# Patient Record
Sex: FEMALE | Race: WHITE | Employment: FULL TIME | URBAN - METROPOLITAN AREA
[De-identification: names, ages, dates, MRNs, and addresses within clinical notes are randomized per-mention and may not be internally consistent; named-entity substitution may affect disease eponyms.]

---

## 2017-03-27 ENCOUNTER — OFFICE VISIT (OUTPATIENT)
Dept: FAMILY MEDICINE CLINIC | Facility: CLINIC | Age: 22
End: 2017-03-27

## 2017-03-27 VITALS
OXYGEN SATURATION: 98 % | DIASTOLIC BLOOD PRESSURE: 60 MMHG | HEART RATE: 75 BPM | TEMPERATURE: 99 F | BODY MASS INDEX: 21 KG/M2 | RESPIRATION RATE: 16 BRPM | SYSTOLIC BLOOD PRESSURE: 120 MMHG | WEIGHT: 132 LBS

## 2017-03-27 DIAGNOSIS — J02.9 SORE THROAT: ICD-10-CM

## 2017-03-27 DIAGNOSIS — H10.33 ACUTE BACTERIAL CONJUNCTIVITIS OF BOTH EYES: Primary | ICD-10-CM

## 2017-03-27 DIAGNOSIS — H66.001 ACUTE SUPPURATIVE OTITIS MEDIA OF RIGHT EAR WITHOUT SPONTANEOUS RUPTURE OF TYMPANIC MEMBRANE, RECURRENCE NOT SPECIFIED: ICD-10-CM

## 2017-03-27 LAB — CONTROL LINE PRESENT WITH A CLEAR BACKGROUND (YES/NO): YES YES/NO

## 2017-03-27 PROCEDURE — 99213 OFFICE O/P EST LOW 20 MIN: CPT | Performed by: NURSE PRACTITIONER

## 2017-03-27 PROCEDURE — 87880 STREP A ASSAY W/OPTIC: CPT | Performed by: NURSE PRACTITIONER

## 2017-03-27 RX ORDER — TOBRAMYCIN 3 MG/ML
2 SOLUTION/ DROPS OPHTHALMIC EVERY 6 HOURS
Qty: 1 BOTTLE | Refills: 0 | Status: SHIPPED | OUTPATIENT
Start: 2017-03-27 | End: 2017-04-03

## 2017-03-27 RX ORDER — AMOXICILLIN 875 MG/1
875 TABLET, COATED ORAL 2 TIMES DAILY
Qty: 20 TABLET | Refills: 0 | Status: SHIPPED | OUTPATIENT
Start: 2017-03-27 | End: 2017-04-06

## 2017-03-27 NOTE — PATIENT INSTRUCTIONS
Conjunctivitis, Nonspecific    The membrane that covers the white part of your eye (the conjunctiva) is inflamed. Inflammation happens when your body responds to an injury, allergic reaction, infection, or illness.  Symptoms of inflammation in the eye may You have an infection of the middle ear, the space behind the eardrum. This is also called acute otitis media (AOM). Sometimes it is caused by the common cold.  This is because congestion can block the internal passage (eustachian tube) that drains fluid fr

## 2017-03-27 NOTE — PROGRESS NOTES
CHIEF COMPLAINT:   Patient presents with:  Pink Eye: pt c\o of pink eye in both eye,x 1day sore throat x 1wk       HPI:   Sandy Pathak is a 24year old female who presents with chief complaint of \"pink eye\". Symptoms began  3  days ago.  Symptoms have HENT: atraumatic, normocephalic,ears R injected with erythema, pos bulging, bony landmarks not visible, L wnl, pos bulging. Nose pos clear drainage red and inflammed. throat are clear  NECK: supple, non tender  LUNGS: clear to auscultation bilaterally.    C Advised patient to avoid touching eyes. Stressed importance of good handwashing as conjunctivitis is very contagious. Warm compresses to affected eye prn. Can return to work/school after on medication for 24 hours.     Patient Instructions     Conjunctiv © 8723-9873 The [address] [place] [address], [address] [place]. All rights reserved. This information is not intended as a substitute for professional medical care. Always follow your healthcare professional's instructions.         Middle © 0734-6473 The [address] [place] [address], [address] [place]. All rights reserved. This information is not intended as a substitute for professional medical care. Always follow your healthcare professional's instructions.             Isaiah

## 2017-05-03 ENCOUNTER — OFFICE VISIT (OUTPATIENT)
Dept: FAMILY MEDICINE CLINIC | Facility: CLINIC | Age: 22
End: 2017-05-03

## 2017-05-03 VITALS
TEMPERATURE: 98 F | DIASTOLIC BLOOD PRESSURE: 60 MMHG | RESPIRATION RATE: 18 BRPM | SYSTOLIC BLOOD PRESSURE: 100 MMHG | WEIGHT: 132 LBS | BODY MASS INDEX: 21 KG/M2 | OXYGEN SATURATION: 98 % | HEART RATE: 77 BPM

## 2017-05-03 DIAGNOSIS — N30.01 ACUTE CYSTITIS WITH HEMATURIA: ICD-10-CM

## 2017-05-03 DIAGNOSIS — R30.9 PAIN PASSING URINE: Primary | ICD-10-CM

## 2017-05-03 PROCEDURE — 81003 URINALYSIS AUTO W/O SCOPE: CPT | Performed by: NURSE PRACTITIONER

## 2017-05-03 PROCEDURE — 87086 URINE CULTURE/COLONY COUNT: CPT | Performed by: NURSE PRACTITIONER

## 2017-05-03 PROCEDURE — 99213 OFFICE O/P EST LOW 20 MIN: CPT | Performed by: NURSE PRACTITIONER

## 2017-05-03 RX ORDER — CIPROFLOXACIN 500 MG/1
500 TABLET, FILM COATED ORAL 2 TIMES DAILY
Qty: 10 TABLET | Refills: 0 | Status: SHIPPED | OUTPATIENT
Start: 2017-05-03 | End: 2017-05-08

## 2017-05-03 NOTE — PROGRESS NOTES
CHIEF COMPLAINT:   Patient presents with:  UTI: pt c\o of UTI       HPI:   Mayuri Benz is a 24year old female who presents with symptoms of UTI. Complaining of urinary frequency, urgency, dysuria for last 2 days.  Symptoms have been constant since on GLUCOSE (URINE DIPSTICK) 100 Negative mg/dL   BILIRUBIN neg Negative   KETONES (URINE DIPSTICK) neg Negative mg/dL   SPECIFIC GRAVITY 1.020 1.005 - 1.030   OCCULT BLOOD small Negative   PH, URINE 5.0 4.5 - 8.0   PROTEIN (URINE DIPSTICK) trace Negative/Trac 3. We will send urine culture to lab and call you with results in 3-4 days. At that time we will discuss continuing, stopping, or changing the antibiotic based on the urine culture results.   4. Follow up with PMD in 4-5 days for reeval. Follow up sooner or The patient/parent indicates understanding of these issues and agrees to the plan. The patient/parent is asked to return in 3 days if not better. Call if fever, vomiting, worsening symptoms.

## 2017-05-03 NOTE — PATIENT INSTRUCTIONS
1. Rest. Drink plenty of fluids. 2. Ciprofloxacin as prescribed. 3. We will send urine culture to lab and call you with results in 3-4 days. At that time we will discuss continuing, stopping, or changing the antibiotic based on the urine culture results. © 3300-9218 [address], [address] [place]. All rights reserved. This information is not intended as a substitute for professional medical care. Always follow your healthcare professional's instructions.

## 2018-01-05 ENCOUNTER — OFFICE VISIT (OUTPATIENT)
Dept: FAMILY MEDICINE CLINIC | Facility: CLINIC | Age: 23
End: 2018-01-05

## 2018-01-05 VITALS
TEMPERATURE: 99 F | OXYGEN SATURATION: 97 % | RESPIRATION RATE: 14 BRPM | WEIGHT: 142 LBS | DIASTOLIC BLOOD PRESSURE: 70 MMHG | HEART RATE: 74 BPM | SYSTOLIC BLOOD PRESSURE: 110 MMHG | BODY MASS INDEX: 23 KG/M2

## 2018-01-05 DIAGNOSIS — N30.00 ACUTE CYSTITIS WITHOUT HEMATURIA: ICD-10-CM

## 2018-01-05 DIAGNOSIS — R39.9 UTI SYMPTOMS: Primary | ICD-10-CM

## 2018-01-05 LAB
APPEARANCE: CLEAR
GLUCOSE (URINE DIPSTICK): 100 MG/DL
MULTISTIX LOT#: ABNORMAL NUMERIC
NITRITE, URINE: POSITIVE
OCCULT BLOOD: 6.5
PH, URINE: 6.5 (ref 4.5–8)
PROTEIN (URINE DIPSTICK): 100 MG/DL
SPECIFIC GRAVITY: 1.02 (ref 1–1.03)
UROBILINOGEN,SEMI-QN: 2 MG/DL (ref 0–1.9)

## 2018-01-05 PROCEDURE — 99213 OFFICE O/P EST LOW 20 MIN: CPT | Performed by: FAMILY MEDICINE

## 2018-01-05 PROCEDURE — 87186 SC STD MICRODIL/AGAR DIL: CPT | Performed by: FAMILY MEDICINE

## 2018-01-05 PROCEDURE — 81003 URINALYSIS AUTO W/O SCOPE: CPT | Performed by: FAMILY MEDICINE

## 2018-01-05 PROCEDURE — 87086 URINE CULTURE/COLONY COUNT: CPT | Performed by: FAMILY MEDICINE

## 2018-01-05 PROCEDURE — 87077 CULTURE AEROBIC IDENTIFY: CPT | Performed by: FAMILY MEDICINE

## 2018-01-05 RX ORDER — CIPROFLOXACIN 500 MG/1
500 TABLET, FILM COATED ORAL 2 TIMES DAILY
Qty: 10 TABLET | Refills: 0 | Status: SHIPPED | OUTPATIENT
Start: 2018-01-05 | End: 2018-01-10

## 2018-01-05 NOTE — PROGRESS NOTES
Mayuri Benz is a 20year old female. HPI:   Patient's presenting with urinary complaints of burning on urination, urinary hesitancy, and mild suprapubic pressure. Patient reports no flank pain and no fever and no chills.   Patient reports no vaginal d edema    ASSESSMENT AND PLAN:     1. UTI symptoms  -  - URINALYSIS, AUTO, W/O SCOPE  - URINE CULTURE, ROUTINE    2. Acute cystitis without hematuria  -oral cipro x 5 days.     -to drink plenty of water, OTC pyridium as needed.        The patient indicates u

## 2018-01-08 ENCOUNTER — TELEPHONE (OUTPATIENT)
Dept: FAMILY MEDICINE CLINIC | Facility: CLINIC | Age: 23
End: 2018-01-08

## 2018-01-09 NOTE — TELEPHONE ENCOUNTER
Notes Recorded by Danny Zavaleta MD on 1/7/2018 at 3:21 PM CST  On cipro.    Culture     10,000 - 50,000 CFU/ML Escherichia coli

## 2018-01-09 NOTE — TELEPHONE ENCOUNTER
Called patient and left message per HIPAA of results below. Advised patient to complete course of antibiotics. Advised patient to contact the office with any questions.

## 2018-06-25 ENCOUNTER — OFFICE VISIT (OUTPATIENT)
Dept: FAMILY MEDICINE CLINIC | Facility: CLINIC | Age: 23
End: 2018-06-25

## 2018-06-25 VITALS
TEMPERATURE: 98 F | OXYGEN SATURATION: 97 % | HEIGHT: 66 IN | WEIGHT: 141.63 LBS | SYSTOLIC BLOOD PRESSURE: 116 MMHG | HEART RATE: 81 BPM | BODY MASS INDEX: 22.76 KG/M2 | DIASTOLIC BLOOD PRESSURE: 82 MMHG | RESPIRATION RATE: 16 BRPM

## 2018-06-25 DIAGNOSIS — R39.9 UTI SYMPTOMS: ICD-10-CM

## 2018-06-25 DIAGNOSIS — N30.00 ACUTE CYSTITIS WITHOUT HEMATURIA: Primary | ICD-10-CM

## 2018-06-25 PROCEDURE — 87086 URINE CULTURE/COLONY COUNT: CPT | Performed by: PHYSICIAN ASSISTANT

## 2018-06-25 PROCEDURE — 81003 URINALYSIS AUTO W/O SCOPE: CPT | Performed by: PHYSICIAN ASSISTANT

## 2018-06-25 PROCEDURE — 99213 OFFICE O/P EST LOW 20 MIN: CPT | Performed by: PHYSICIAN ASSISTANT

## 2018-06-25 RX ORDER — CIPROFLOXACIN 500 MG/1
500 TABLET, FILM COATED ORAL 2 TIMES DAILY
Qty: 10 TABLET | Refills: 0 | Status: SHIPPED | OUTPATIENT
Start: 2018-06-25 | End: 2018-06-30

## 2018-06-25 NOTE — PROGRESS NOTES
CHIEF COMPLAINT:   Patient presents with:  Painful Urination: x3days  Burning On Urination: x3days  Urinary Urgency: x3days      HPI:   Mayuri Benz is a 20year old female who presents with symptoms of UTI.  The patient reports urinary frequency, urge LUNGS: clear to ausculation bilaterally, no wheezing or rhonchi  GI: BS present x 4. No hepatosplenomegaly. No tenderness.    BACK: No CVA tenderness      Recent Results (from the past 24 hour(s))  -URINALYSIS, AUTO, W/O SCOPE   Collection Time: 06/25/18 Urine is normally doesn't have any bacteria in it. But bacteria can get into the urinary tract from the skin around the rectum. Or they can travel in the blood from elsewhere in the body.  Once they are in your urinary tract, they can cause infection in the · Procedures such as having a catheter inserted  · Older age  · Not emptying your bladder. This can allow bacteria a chance to grow in your urine.   · Dehydration  · Constipation  · Sex  · Use of a diaphragm for birth control   Treatment  Bladder infections · Urinate right after intercourse to flush out your bladder. · If you use birth control pills and have frequent bladder infections, discuss it with your doctor.   Follow-up care  Call your healthcare provider if all symptoms are not gone after 3 days of tr

## 2018-06-25 NOTE — PATIENT INSTRUCTIONS
1. Cipro  2. Urine culture sent  3. Increase fluids  4. Follow up with PCP    Bladder Infection, Female (Adult)    Urine is normally doesn't have any bacteria in it. But bacteria can get into the urinary tract from the skin around the rectum.  Or they can The most common cause of bladder infections is bacteria from the bowels. The bacteria get onto the skin around the opening of the urethra. From there, they can get into the urine and travel up to the bladder, causing inflammation and infection.  This usuall · Urinate more often. Don't try to hold urine in for a long time. · Wear loose-fitting clothes and cotton underwear. Avoid tight-fitting pants. · Improve your diet and prevent constipation.  Eat more fresh fruit and vegetables, and fiber, and less junk an

## 2018-06-27 ENCOUNTER — TELEPHONE (OUTPATIENT)
Dept: FAMILY MEDICINE CLINIC | Facility: CLINIC | Age: 23
End: 2018-06-27

## 2018-06-27 DIAGNOSIS — B37.9 YEAST INFECTION: Primary | ICD-10-CM

## 2018-06-27 RX ORDER — FLUCONAZOLE 150 MG/1
150 TABLET ORAL ONCE
Qty: 1 TABLET | Refills: 0 | Status: SHIPPED | OUTPATIENT
Start: 2018-06-27 | End: 2018-06-27

## 2018-10-23 ENCOUNTER — OFFICE VISIT (OUTPATIENT)
Dept: FAMILY MEDICINE CLINIC | Facility: CLINIC | Age: 23
End: 2018-10-23
Payer: COMMERCIAL

## 2018-10-23 ENCOUNTER — LAB ENCOUNTER (OUTPATIENT)
Dept: LAB | Age: 23
End: 2018-10-23
Attending: FAMILY MEDICINE
Payer: COMMERCIAL

## 2018-10-23 VITALS
TEMPERATURE: 98 F | HEART RATE: 65 BPM | DIASTOLIC BLOOD PRESSURE: 80 MMHG | OXYGEN SATURATION: 99 % | SYSTOLIC BLOOD PRESSURE: 112 MMHG | WEIGHT: 144.19 LBS | HEIGHT: 66 IN | BODY MASS INDEX: 23.17 KG/M2 | RESPIRATION RATE: 16 BRPM

## 2018-10-23 DIAGNOSIS — N39.0 RECURRENT UTI: ICD-10-CM

## 2018-10-23 DIAGNOSIS — Z11.3 SCREEN FOR STD (SEXUALLY TRANSMITTED DISEASE): ICD-10-CM

## 2018-10-23 DIAGNOSIS — R11.2 NAUSEA AND VOMITING, INTRACTABILITY OF VOMITING NOT SPECIFIED, UNSPECIFIED VOMITING TYPE: ICD-10-CM

## 2018-10-23 DIAGNOSIS — R39.9 UTI SYMPTOMS: Primary | ICD-10-CM

## 2018-10-23 PROCEDURE — 84439 ASSAY OF FREE THYROXINE: CPT | Performed by: FAMILY MEDICINE

## 2018-10-23 PROCEDURE — 81003 URINALYSIS AUTO W/O SCOPE: CPT | Performed by: FAMILY MEDICINE

## 2018-10-23 PROCEDURE — 81025 URINE PREGNANCY TEST: CPT | Performed by: FAMILY MEDICINE

## 2018-10-23 PROCEDURE — 87086 URINE CULTURE/COLONY COUNT: CPT | Performed by: FAMILY MEDICINE

## 2018-10-23 PROCEDURE — 99214 OFFICE O/P EST MOD 30 MIN: CPT | Performed by: FAMILY MEDICINE

## 2018-10-23 PROCEDURE — 80050 GENERAL HEALTH PANEL: CPT | Performed by: FAMILY MEDICINE

## 2018-10-23 PROCEDURE — 36415 COLL VENOUS BLD VENIPUNCTURE: CPT | Performed by: FAMILY MEDICINE

## 2018-10-23 PROCEDURE — 87491 CHLMYD TRACH DNA AMP PROBE: CPT | Performed by: FAMILY MEDICINE

## 2018-10-23 PROCEDURE — 87591 N.GONORRHOEAE DNA AMP PROB: CPT | Performed by: FAMILY MEDICINE

## 2018-10-23 RX ORDER — ONDANSETRON 4 MG/1
4 TABLET, FILM COATED ORAL EVERY 8 HOURS PRN
Qty: 20 TABLET | Refills: 1 | Status: SHIPPED | OUTPATIENT
Start: 2018-10-23 | End: 2019-01-03 | Stop reason: ALTCHOICE

## 2018-10-23 NOTE — PROGRESS NOTES
Chief Complaint:   Patient presents with:  Burning On Urination: x 1 month, pressure in pelvic area  Nausea: x 4 days    HPI:   This is a 24year old female presenting with worsening pelvic pain along with dysuria x 1 week.    Patient's sexually active with Answered       REVIEW OF SYSTEMS:   Review of Systems   Constitutional: Negative for chills, diaphoresis, fatigue and fever.    HENT: Negative for congestion, ear pain, facial swelling, postnasal drip, rhinorrhea, sinus pressure, sore throat and voice singh person, place, and time. She appears well-developed and well-nourished. HENT:   Head: Normocephalic and atraumatic. Right Ear: Tympanic membrane and ear canal normal. Tympanic membrane is not erythematous.  No cerumen present  Left Ear: Tympanic membran CHLAMYDIA/GONOCOCCUS, PHOEBE; Future  - CHLAMYDIA/GONOCOCCUS, PHOEBE  - URINE CULTURE, ROUTINE    2. Nausea and vomiting, intractability of vomiting not specified, unspecified vomiting type  -will check labs. - CBC WITH DIFFERENTIAL WITH PLATELET;  Future  - CO

## 2018-10-24 ENCOUNTER — TELEPHONE (OUTPATIENT)
Dept: FAMILY MEDICINE CLINIC | Facility: CLINIC | Age: 23
End: 2018-10-24

## 2018-10-24 NOTE — TELEPHONE ENCOUNTER
----- Message from Johann Simon MD sent at 10/24/2018  1:18 PM CDT -----  Slightly elevated WBCs and neutrophils. Ok to watch for now. Negative STD screen(ivon/hanna). Have patient make follow up appt in 1-2 weeks if no improvement.

## 2019-01-03 ENCOUNTER — OFFICE VISIT (OUTPATIENT)
Dept: FAMILY MEDICINE CLINIC | Facility: CLINIC | Age: 24
End: 2019-01-03
Payer: COMMERCIAL

## 2019-01-03 VITALS
RESPIRATION RATE: 16 BRPM | HEART RATE: 76 BPM | OXYGEN SATURATION: 96 % | WEIGHT: 147 LBS | BODY MASS INDEX: 23.63 KG/M2 | TEMPERATURE: 98 F | SYSTOLIC BLOOD PRESSURE: 122 MMHG | HEIGHT: 66 IN | DIASTOLIC BLOOD PRESSURE: 68 MMHG

## 2019-01-03 DIAGNOSIS — N30.00 ACUTE CYSTITIS WITHOUT HEMATURIA: Primary | ICD-10-CM

## 2019-01-03 LAB
APPEARANCE: CLEAR
GLUCOSE (URINE DIPSTICK): 100 MG/DL
MULTISTIX LOT#: ABNORMAL NUMERIC
NITRITE, URINE: POSITIVE
PH, URINE: 5.5 (ref 4.5–8)
PROTEIN (URINE DIPSTICK): 30 MG/DL
SPECIFIC GRAVITY: 1.01 (ref 1–1.03)
UROBILINOGEN,SEMI-QN: 2 MG/DL (ref 0–1.9)

## 2019-01-03 PROCEDURE — 99213 OFFICE O/P EST LOW 20 MIN: CPT | Performed by: FAMILY MEDICINE

## 2019-01-03 PROCEDURE — 81003 URINALYSIS AUTO W/O SCOPE: CPT | Performed by: FAMILY MEDICINE

## 2019-01-03 PROCEDURE — 87086 URINE CULTURE/COLONY COUNT: CPT | Performed by: FAMILY MEDICINE

## 2019-01-03 RX ORDER — CIPROFLOXACIN 500 MG/1
500 TABLET, FILM COATED ORAL 2 TIMES DAILY
Qty: 10 TABLET | Refills: 0 | Status: SHIPPED | OUTPATIENT
Start: 2019-01-03 | End: 2019-05-22

## 2019-01-03 NOTE — PROGRESS NOTES
Liat Lloyd is a 24year old female. Patient presents with:  UTI: burning with urination, pressure on bladder, frequent urination      HPI:   Patient presents with symptoms of UTI.  Complaining of urinary frequency, urgency, dysuria, suprapubic pain fo

## 2019-05-22 ENCOUNTER — OFFICE VISIT (OUTPATIENT)
Dept: FAMILY MEDICINE CLINIC | Facility: CLINIC | Age: 24
End: 2019-05-22
Payer: COMMERCIAL

## 2019-05-22 VITALS
RESPIRATION RATE: 17 BRPM | DIASTOLIC BLOOD PRESSURE: 60 MMHG | WEIGHT: 142.81 LBS | HEART RATE: 57 BPM | TEMPERATURE: 98 F | HEIGHT: 66 IN | SYSTOLIC BLOOD PRESSURE: 112 MMHG | BODY MASS INDEX: 22.95 KG/M2 | OXYGEN SATURATION: 98 %

## 2019-05-22 DIAGNOSIS — Z13.21 SCREENING FOR ENDOCRINE, NUTRITIONAL, METABOLIC AND IMMUNITY DISORDER: ICD-10-CM

## 2019-05-22 DIAGNOSIS — Z13.29 SCREENING FOR ENDOCRINE, NUTRITIONAL, METABOLIC AND IMMUNITY DISORDER: ICD-10-CM

## 2019-05-22 DIAGNOSIS — R39.9 UTI SYMPTOMS: Primary | ICD-10-CM

## 2019-05-22 DIAGNOSIS — Z13.228 SCREENING FOR ENDOCRINE, NUTRITIONAL, METABOLIC AND IMMUNITY DISORDER: ICD-10-CM

## 2019-05-22 DIAGNOSIS — Z13.0 SCREENING FOR ENDOCRINE, NUTRITIONAL, METABOLIC AND IMMUNITY DISORDER: ICD-10-CM

## 2019-05-22 PROCEDURE — 87088 URINE BACTERIA CULTURE: CPT | Performed by: NURSE PRACTITIONER

## 2019-05-22 PROCEDURE — 99213 OFFICE O/P EST LOW 20 MIN: CPT | Performed by: NURSE PRACTITIONER

## 2019-05-22 PROCEDURE — 81003 URINALYSIS AUTO W/O SCOPE: CPT | Performed by: NURSE PRACTITIONER

## 2019-05-22 PROCEDURE — 87086 URINE CULTURE/COLONY COUNT: CPT | Performed by: NURSE PRACTITIONER

## 2019-05-22 PROCEDURE — 87186 SC STD MICRODIL/AGAR DIL: CPT | Performed by: NURSE PRACTITIONER

## 2019-05-22 RX ORDER — NITROFURANTOIN 25; 75 MG/1; MG/1
100 CAPSULE ORAL 2 TIMES DAILY
Qty: 10 CAPSULE | Refills: 0 | Status: SHIPPED | OUTPATIENT
Start: 2019-05-22 | End: 2019-05-27

## 2019-05-22 RX ORDER — PHENAZOPYRIDINE HYDROCHLORIDE 200 MG/1
200 TABLET, FILM COATED ORAL 3 TIMES DAILY PRN
Qty: 6 TABLET | Refills: 0 | Status: SHIPPED | OUTPATIENT
Start: 2019-05-22 | End: 2019-05-24

## 2019-05-22 NOTE — PROGRESS NOTES
Chief Complaint:   Patient presents with:  UTI: pain when urinate , nausea , odor , last night     HPI:   This is a 24year old female presenting with dysuria and suprapubic pressure x 1 day. Also reports cloudy urine with foul odor. + nausea.   This is a r myalgias, back pain, joint swelling and joint pain. Skin: Negative for pallor, rash and wound. Neurological: Negative for dizziness, seizures and numbness.        EXAM:   /60   Pulse 57   Temp 97.8 °F (36.6 °C) (Oral)   Resp 17   Ht 66\"   Wt 142 irritants.   -Follow up in 1 week if no improvement, sooner if symptoms worsen.   -Discussed potential for urology f/u in the future, especially if cultures are persistently negative.      2. Screening for endocrine, nutritional, metabolic and immunity diso

## 2019-05-22 NOTE — PATIENT INSTRUCTIONS
Urinary Tract Infections in Women    Urinary tract infections (UTIs) are most often caused by bacteria. These bacteria enter the urinary tract. The bacteria may come from outside the body.  Or they may travel from the skin outside the rectum or vagina i The lifestyle changes below will help get rid of your UTI. They may also help prevent future UTIs. · Drink plenty of fluids. This includes water, juice, or other caffeine-free drinks. Fluids help flush bacteria out of your body. · Empty your bladder.  Alw

## 2019-05-29 ENCOUNTER — TELEPHONE (OUTPATIENT)
Dept: FAMILY MEDICINE CLINIC | Facility: CLINIC | Age: 24
End: 2019-05-29

## 2019-05-29 NOTE — TELEPHONE ENCOUNTER
Received message from answering service. 'looking for test results and meds are not working.' results were released to [employer] and macrobid sensitive to culture. Left message results were released to [employer] and antibiotic sensitive to bacteria.  #left to

## 2020-10-11 ENCOUNTER — OFFICE VISIT (OUTPATIENT)
Dept: FAMILY MEDICINE CLINIC | Facility: CLINIC | Age: 25
End: 2020-10-11
Payer: COMMERCIAL

## 2020-10-11 VITALS
WEIGHT: 131.81 LBS | OXYGEN SATURATION: 98 % | HEIGHT: 65 IN | SYSTOLIC BLOOD PRESSURE: 108 MMHG | HEART RATE: 76 BPM | RESPIRATION RATE: 16 BRPM | TEMPERATURE: 98 F | DIASTOLIC BLOOD PRESSURE: 60 MMHG | BODY MASS INDEX: 21.96 KG/M2

## 2020-10-11 DIAGNOSIS — B37.3 VAGINAL CANDIDA: ICD-10-CM

## 2020-10-11 DIAGNOSIS — R39.9 UTI SYMPTOMS: ICD-10-CM

## 2020-10-11 DIAGNOSIS — R30.0 DYSURIA: Primary | ICD-10-CM

## 2020-10-11 PROCEDURE — 3008F BODY MASS INDEX DOCD: CPT | Performed by: NURSE PRACTITIONER

## 2020-10-11 PROCEDURE — 87088 URINE BACTERIA CULTURE: CPT | Performed by: NURSE PRACTITIONER

## 2020-10-11 PROCEDURE — 87086 URINE CULTURE/COLONY COUNT: CPT | Performed by: NURSE PRACTITIONER

## 2020-10-11 PROCEDURE — 87186 SC STD MICRODIL/AGAR DIL: CPT | Performed by: NURSE PRACTITIONER

## 2020-10-11 PROCEDURE — 3078F DIAST BP <80 MM HG: CPT | Performed by: NURSE PRACTITIONER

## 2020-10-11 PROCEDURE — 3074F SYST BP LT 130 MM HG: CPT | Performed by: NURSE PRACTITIONER

## 2020-10-11 PROCEDURE — 81003 URINALYSIS AUTO W/O SCOPE: CPT | Performed by: NURSE PRACTITIONER

## 2020-10-11 PROCEDURE — 99213 OFFICE O/P EST LOW 20 MIN: CPT | Performed by: NURSE PRACTITIONER

## 2020-10-11 RX ORDER — FLUCONAZOLE 150 MG/1
150 TABLET ORAL ONCE
Qty: 1 TABLET | Refills: 0 | Status: SHIPPED | OUTPATIENT
Start: 2020-10-11 | End: 2020-10-11

## 2020-10-11 RX ORDER — CEPHALEXIN 500 MG/1
500 CAPSULE ORAL 2 TIMES DAILY
Qty: 14 CAPSULE | Refills: 0 | Status: SHIPPED | OUTPATIENT
Start: 2020-10-11 | End: 2020-10-18

## 2020-10-11 NOTE — PROGRESS NOTES
Mariela Canales is a 31year old female. HPI:   Patient presents with urinary symptoms. Complaining of urinary frequency, urgency, dysuria, suprapubic pain. Denies back pain, fever, hematuria.   Duration: since yesterday  Sexual activity: 3 days ago with spo Urobilinogen Urine 0.2 0.0 - 1.9 mg/dL    Nitrite Urine positive Negative    Leukocyte Esterase Urine negative Negative    APPEARANCE clear Clear    Color Urine dark yellow Yellow    Multistix Lot# [id number] Numeric    Multistix Expiration Date 10/31/21 Date

## 2020-10-11 NOTE — PATIENT INSTRUCTIONS
· Please start the antibiotic as discussed. We will send a urine culture and advise you if a change is needed in your antibiotic.   · You may use Diflucan once by mouth for vaginal itching or yeast.  · Take probiotics or yogurt daily during antibiotic use t

## 2020-10-16 ENCOUNTER — OFFICE VISIT (OUTPATIENT)
Dept: FAMILY MEDICINE CLINIC | Facility: CLINIC | Age: 25
End: 2020-10-16
Payer: COMMERCIAL

## 2020-10-16 VITALS
OXYGEN SATURATION: 98 % | HEART RATE: 90 BPM | SYSTOLIC BLOOD PRESSURE: 108 MMHG | TEMPERATURE: 98 F | RESPIRATION RATE: 16 BRPM | DIASTOLIC BLOOD PRESSURE: 72 MMHG | HEIGHT: 65 IN | WEIGHT: 131 LBS | BODY MASS INDEX: 21.83 KG/M2

## 2020-10-16 DIAGNOSIS — R10.9 LEFT FLANK PAIN: ICD-10-CM

## 2020-10-16 DIAGNOSIS — N30.00 ACUTE CYSTITIS WITHOUT HEMATURIA: Primary | ICD-10-CM

## 2020-10-16 PROCEDURE — 3074F SYST BP LT 130 MM HG: CPT | Performed by: NURSE PRACTITIONER

## 2020-10-16 PROCEDURE — 3078F DIAST BP <80 MM HG: CPT | Performed by: NURSE PRACTITIONER

## 2020-10-16 PROCEDURE — 3008F BODY MASS INDEX DOCD: CPT | Performed by: NURSE PRACTITIONER

## 2020-10-16 PROCEDURE — 99213 OFFICE O/P EST LOW 20 MIN: CPT | Performed by: NURSE PRACTITIONER

## 2020-10-16 RX ORDER — SULFAMETHOXAZOLE AND TRIMETHOPRIM 800; 160 MG/1; MG/1
1 TABLET ORAL 2 TIMES DAILY
Qty: 20 TABLET | Refills: 0 | Status: SHIPPED | OUTPATIENT
Start: 2020-10-16 | End: 2020-10-26

## 2020-10-16 NOTE — PATIENT INSTRUCTIONS
Go to ER or IC if worsening symptoms or no improvement in 24-48 hours    Kidney Infection (Adult Female)     An infection in one or both kidneys is called pyelonephritis. It usually happens when bacteria ) get into the kidney.  Rarely it is caused when ot · Take antibiotics exactly as prescribed and until they are used up, even if you feel better. It's important to finish them to make sure the infection is gone.   · Unless another medicine was prescribed, you can use over-the-counter medicines for pain, feve · Improve your diet to prevent constipation. Eat more fruits, vegetables, and fiber. Eat less junk and fatty foods. Constipation can make a urinary tract infection more likely. Talk with your healthcare provider if you have trouble with bowel movements.   · © 5410-6173 The [address]. [address] [facility] [place]. All rights reserved. This information is not intended as a substitute for professional medical care. Always follow your healthcare professional's instructions.

## 2020-10-16 NOTE — PROGRESS NOTES
CHIEF COMPLAINT:   Patient presents with:  UTI: here on 10/11/20. lower left back pain x 1 day. uti sx improving. still taking abx      HPI:   Roman Mane is a 31year old female who presents with symptoms of UTI.  Patient was seen 5 days ago and treate : no suprapubic tenderness. + Left CVAT. No bladder distention. No results found for this or any previous visit (from the past 24 hour(s)). ASSESSMENT AND PLAN:   Mumtaz Molina is a 31year old female presents with UTI symptoms.     ASSESSMENT: · Not keeping the genital area clean and dry, which promotes the growth of bacteria  · Wiping back to front. This drags bacteria from the rectum toward the urinary opening (urethra). · Wearing tight pants or underwear.  This lets moisture build up in the g · Drink lots of fluid unless you must restrict fluids for other medical reasons. This will force the medicine into your urinary system and flush the bacteria out of your body. Ask your healthcare provider how much you should drink.   · Don't have sex until If you had an X-ray, CT scan, or other diagnostic test, you will be notified of any new findings that may affect your care.   Call 911  Call 911 if any of the following occur:  · Trouble breathing  · Fainting or loss of consciousness  · Rapid or very slow h

## 2021-02-01 ENCOUNTER — OFFICE VISIT (OUTPATIENT)
Dept: FAMILY MEDICINE CLINIC | Facility: CLINIC | Age: 26
End: 2021-02-01
Payer: COMMERCIAL

## 2021-02-01 VITALS
WEIGHT: 125 LBS | SYSTOLIC BLOOD PRESSURE: 126 MMHG | OXYGEN SATURATION: 98 % | TEMPERATURE: 98 F | HEIGHT: 65 IN | DIASTOLIC BLOOD PRESSURE: 68 MMHG | BODY MASS INDEX: 20.83 KG/M2 | RESPIRATION RATE: 18 BRPM

## 2021-02-01 DIAGNOSIS — R39.9 UTI SYMPTOMS: Primary | ICD-10-CM

## 2021-02-01 LAB
APPEARANCE: CLEAR
BILIRUBIN: NEGATIVE
CONTROL LINE PRESENT WITH A CLEAR BACKGROUND (YES/NO): YES YES/NO
GLUCOSE (URINE DIPSTICK): NEGATIVE MG/DL
KETONES (URINE DIPSTICK): NEGATIVE MG/DL
MULTISTIX LOT#: 5077 NUMERIC
PH, URINE: 5.5 (ref 4.5–8)
PREGNANCY TEST, URINE: NEGATIVE
PROTEIN (URINE DIPSTICK): NEGATIVE MG/DL
SPECIFIC GRAVITY: 1.02 (ref 1–1.03)
UROBILINOGEN,SEMI-QN: 0.2 MG/DL (ref 0–1.9)

## 2021-02-01 PROCEDURE — 3074F SYST BP LT 130 MM HG: CPT | Performed by: NURSE PRACTITIONER

## 2021-02-01 PROCEDURE — 81003 URINALYSIS AUTO W/O SCOPE: CPT | Performed by: NURSE PRACTITIONER

## 2021-02-01 PROCEDURE — 99213 OFFICE O/P EST LOW 20 MIN: CPT | Performed by: NURSE PRACTITIONER

## 2021-02-01 PROCEDURE — 3078F DIAST BP <80 MM HG: CPT | Performed by: NURSE PRACTITIONER

## 2021-02-01 PROCEDURE — 87086 URINE CULTURE/COLONY COUNT: CPT | Performed by: NURSE PRACTITIONER

## 2021-02-01 PROCEDURE — 81025 URINE PREGNANCY TEST: CPT | Performed by: NURSE PRACTITIONER

## 2021-02-01 PROCEDURE — 3008F BODY MASS INDEX DOCD: CPT | Performed by: NURSE PRACTITIONER

## 2021-02-01 RX ORDER — CEPHALEXIN 500 MG/1
500 CAPSULE ORAL 2 TIMES DAILY
Qty: 14 CAPSULE | Refills: 0 | Status: SHIPPED | OUTPATIENT
Start: 2021-02-01 | End: 2021-02-08

## 2021-02-01 NOTE — PATIENT INSTRUCTIONS
1. Rest. Drink plenty of fluids. 2. Keflex as prescribed. 3. We will send urine culture to lab and call you with results in 3-4 days. At that time we will discuss continuing, stopping, or changing the antibiotic based on the urine culture results.   4. Fo infection. It may be as short as 3 days. If you have repeated UTIs, you may need a low-dose antibiotic for several months. Take antibiotics exactly as directed. Don’t stop taking them until all of the medicine is gone.  If you stop taking the antibiotic too

## 2021-02-01 NOTE — PROGRESS NOTES
CHIEF COMPLAINT:   Patient presents with:  UTI      HPI:   Denny Locke is a 31year old female who presents with symptoms of UTI. Complaining of urinary frequency, urgency, dysuria since yestreday.    Denies flank pain, fever, hematuria, nausea, or vomi Bilirubin negative Negative    Ketones, UA negative Negative mg/dL    Spec Gravity 1.020 1.005 - 1.030    Blood Urine trace Negative    PH Urine 5.5 4.5 - 8.0    Protein Urine negative Negative/Trace mg/dL    Urobilinogen Urine 0.2 0.0 - 1.9 mg/dL    Nitr Discussed HPI and physical exam with pt. Pt has a reassuring physical exam consistent with a lower uti. Treatment options discussed with patient and explained in detail. Pt is breastfeeding.  Pt notes keflex has worked well in the past for her and prefers t Urinary tract infections (UTIs) are most often caused by bacteria. These bacteria enter the urinary tract. The bacteria may come from inside the body. Or they may travel from the skin outside the rectum or vagina into the urethra.  Female anatomy makes it e The lifestyle changes below will help get rid of your UTI. They may also help prevent future UTIs. · Drink plenty of fluids. This includes water, juice, or other caffeine-free drinks. Fluids help flush bacteria out of your body. · Empty your bladder.  Al

## 2021-09-23 ENCOUNTER — OFFICE VISIT (OUTPATIENT)
Dept: FAMILY MEDICINE CLINIC | Facility: CLINIC | Age: 26
End: 2021-09-23
Payer: COMMERCIAL

## 2021-09-23 VITALS
HEIGHT: 65 IN | OXYGEN SATURATION: 98 % | RESPIRATION RATE: 16 BRPM | DIASTOLIC BLOOD PRESSURE: 50 MMHG | TEMPERATURE: 98 F | WEIGHT: 127 LBS | SYSTOLIC BLOOD PRESSURE: 90 MMHG | BODY MASS INDEX: 21.16 KG/M2 | HEART RATE: 64 BPM

## 2021-09-23 DIAGNOSIS — R39.9 UTI SYMPTOMS: Primary | ICD-10-CM

## 2021-09-23 PROCEDURE — 99213 OFFICE O/P EST LOW 20 MIN: CPT | Performed by: PHYSICIAN ASSISTANT

## 2021-09-23 PROCEDURE — 3074F SYST BP LT 130 MM HG: CPT | Performed by: PHYSICIAN ASSISTANT

## 2021-09-23 PROCEDURE — 3078F DIAST BP <80 MM HG: CPT | Performed by: PHYSICIAN ASSISTANT

## 2021-09-23 PROCEDURE — 87086 URINE CULTURE/COLONY COUNT: CPT | Performed by: PHYSICIAN ASSISTANT

## 2021-09-23 PROCEDURE — 3008F BODY MASS INDEX DOCD: CPT | Performed by: PHYSICIAN ASSISTANT

## 2021-09-23 RX ORDER — CIPROFLOXACIN 250 MG/1
250 TABLET, FILM COATED ORAL 2 TIMES DAILY
Qty: 10 TABLET | Refills: 0 | Status: SHIPPED | OUTPATIENT
Start: 2021-09-23 | End: 2021-09-28

## 2021-09-23 NOTE — PROGRESS NOTES
CHIEF COMPLAINT:   Patient presents with:  UTI: Entered by patient      HPI:   Opal Mercado is a 31year old female who presents with symptoms of UTI. Complaining of urinary frequency, urgency, dysuria  since this morning.    She notes a change to odor o hour(s)). ASSESSMENT AND PLAN:   Kelly Silvestre is a 31year old female presents with UTI symptoms. ASSESSMENT:  Uti symptoms  (primary encounter diagnosis)    PLAN: Meds as listed below. Push fluids.   Comfort measures as described in Patient Instr

## 2022-06-21 ENCOUNTER — VIRTUAL PHONE E/M (OUTPATIENT)
Dept: INTERNAL MEDICINE CLINIC | Facility: CLINIC | Age: 27
End: 2022-06-21
Payer: COMMERCIAL

## 2022-06-21 VITALS — WEIGHT: 130 LBS | BODY MASS INDEX: 21.66 KG/M2 | HEIGHT: 65 IN

## 2022-06-21 DIAGNOSIS — N30.00 ACUTE CYSTITIS WITHOUT HEMATURIA: Primary | ICD-10-CM

## 2022-06-21 PROCEDURE — 99213 OFFICE O/P EST LOW 20 MIN: CPT

## 2022-06-21 PROCEDURE — 3008F BODY MASS INDEX DOCD: CPT

## 2022-06-21 RX ORDER — CIPROFLOXACIN 250 MG/1
250 TABLET, FILM COATED ORAL 2 TIMES DAILY
Qty: 10 TABLET | Refills: 0 | Status: SHIPPED | OUTPATIENT
Start: 2022-06-21 | End: 2022-06-26

## 2022-06-24 ENCOUNTER — TELEPHONE (OUTPATIENT)
Dept: INTERNAL MEDICINE CLINIC | Facility: CLINIC | Age: 27
End: 2022-06-24

## 2022-06-24 DIAGNOSIS — B37.3 VAGINAL YEAST INFECTION: Primary | ICD-10-CM

## 2022-06-24 RX ORDER — FLUCONAZOLE 150 MG/1
TABLET ORAL
Qty: 2 TABLET | Refills: 0 | Status: SHIPPED | OUTPATIENT
Start: 2022-06-24

## 2022-06-24 NOTE — TELEPHONE ENCOUNTER
VAISHNAVI RENDON. Spoke with pt she reports vaginal discomfort, burning, itching and some discharge since beginning Cipro for UTI. Per Dr. Steffanie Raphael Fluconazole 150mg 1 tab today and repeat in 3 days. D/w pt above she expressed understanding. RX sent.

## 2022-09-15 ENCOUNTER — TELEPHONE (OUTPATIENT)
Dept: INTERNAL MEDICINE CLINIC | Facility: CLINIC | Age: 27
End: 2022-09-15

## 2022-09-15 DIAGNOSIS — B37.3 VAGINAL YEAST INFECTION: ICD-10-CM

## 2022-09-15 DIAGNOSIS — N30.90 CYSTITIS: Primary | ICD-10-CM

## 2022-09-15 RX ORDER — CIPROFLOXACIN 250 MG/1
250 TABLET, FILM COATED ORAL 2 TIMES DAILY
Qty: 14 TABLET | Refills: 0 | Status: SHIPPED | OUTPATIENT
Start: 2022-09-15

## 2022-09-15 RX ORDER — FLUCONAZOLE 150 MG/1
TABLET ORAL
Qty: 2 TABLET | Refills: 0 | Status: SHIPPED | OUTPATIENT
Start: 2022-09-15

## 2022-12-20 ENCOUNTER — TELEPHONE (OUTPATIENT)
Facility: CLINIC | Age: 27
End: 2022-12-20

## 2022-12-20 DIAGNOSIS — O20.0 THREATENED ABORTION, ANTEPARTUM: Primary | ICD-10-CM

## 2022-12-20 NOTE — TELEPHONE ENCOUNTER
Pt is f/u with [facility]. She found a provider that she got a long with in the same practice. She will not be coming to our office for care. She had a D&C schedule with them tomorrow.

## 2022-12-20 NOTE — TELEPHONE ENCOUNTER
----- Message from Jerri Shetty MD sent at 2022  4:16 PM CST -----  Regarding: request from PCP for appt ASAP, possible miscarriage  Hi eunice,  This patient's PCP Dr. Aimee Bustillos just called me saying this pt had been seeing a midwife at [facility] and was diagnosed with missed spontaneous , apparently the midwife was trying to get her in to see an OB for management but there were some difficulties in communication so pt reached out to her PCP for help (who in turn asked me for help). Can we get her in for a GYN appointment this week? I looked in her care everywhere and she did have an ultrasound without an embryo, so was either very early or is a miscarriage. Her HCGs did go down, though, so that confirms it is not a viable pregnancy. I'd like for her to get one more HCG with us before the appointment, just to help make sure it is really a miscarriage and not an ectopic pregnancy.   Can double book her at 4:30 tomorrow (Tues) since the [facility] [address] [address] then will also need an NST so I can see this pt first while waiting for NST  Thanks

## 2022-12-20 NOTE — TELEPHONE ENCOUNTER
Pt was scheduled for 12/20/22 at 4:45 pm; pt called today to cancel appointment and did not want to r/s.

## 2023-01-12 ENCOUNTER — MOBILE ENCOUNTER (OUTPATIENT)
Dept: INTERNAL MEDICINE CLINIC | Facility: CLINIC | Age: 28
End: 2023-01-12

## 2023-01-12 DIAGNOSIS — S16.1XXA STRAIN OF NECK MUSCLE, INITIAL ENCOUNTER: Primary | ICD-10-CM

## 2023-01-12 RX ORDER — CYCLOBENZAPRINE HCL 10 MG
10 TABLET ORAL NIGHTLY
Qty: 5 TABLET | Refills: 0 | Status: SHIPPED | OUTPATIENT
Start: 2023-01-12

## 2023-01-24 ENCOUNTER — TELEPHONE (OUTPATIENT)
Dept: INTERNAL MEDICINE CLINIC | Facility: CLINIC | Age: 28
End: 2023-01-24

## 2023-01-24 DIAGNOSIS — B37.31 VAGINAL YEAST INFECTION: ICD-10-CM

## 2023-01-24 RX ORDER — FLUCONAZOLE 150 MG/1
TABLET ORAL
Qty: 2 TABLET | Refills: 0 | Status: SHIPPED | OUTPATIENT
Start: 2023-01-24

## 2023-01-24 NOTE — TELEPHONE ENCOUNTER
Patient complaint of yeast infection. Per Dr. Price Manual: Diflucan one tablet, repeat additional tablet three days later. LVM notifying patient rx sent to pharmacy.

## 2023-03-02 ENCOUNTER — TELEPHONE (OUTPATIENT)
Dept: INTERNAL MEDICINE CLINIC | Facility: CLINIC | Age: 28
End: 2023-03-02

## 2023-03-02 NOTE — TELEPHONE ENCOUNTER
Spoke with pt she had a miscarriage in December and currently is 6 weeks pregnant. She was seen @ [facility] on 2/28 with left side and back pain. HCG was 3890. Ultrasound indicated 1 gestational sac with fetus and heart rate 129. Pt attempted to make appt with ob/gyne and appt scheduled for tomorrow at 2pm.  Pt now has brown spotting. She has sent a [employer] message to her ob/gyne. She is requesting a progesterone suppository. Per June STEELEN await response from ob as IM is unfamiliar with dosing, directions and when this medication is appropriate to prescribe in pregnancy. D/w pt above. Pt reports she did speak with her ob office and medication was prescribed.

## (undated) NOTE — LETTER
Date: 1/3/2019    Patient Name: Augusta Vasquez          To Whom it may concern: The above patient was seen at the [facility] for treatment of a medical condition. This patient should be excused from attending work/school from 01/03/2019.

## (undated) NOTE — MR AVS SNAPSHOT
[address]  [address] [facility] [address] [phone]               Thank you for choosing us for your health care visit with Regina Ponce NP.   We are glad to serve you and happy to provide you with this summary of other than in men. Wiping from front to back after using the toilet and keeping the area clean can help prevent germs from getting to the urethra. · Blockage of urine flow through the urinary tract.  If urine sits too long, germs may begin to grow out of c Component Value Standard Range & Units    GLUCOSE (URINE DIPSTICK) 100 Negative mg/dL    BILIRUBIN neg Negative    KETONES (URINE DIPSTICK) neg Negative mg/dL    SPECIFIC GRAVITY 1.020 1.005 - 1.030    OCCULT BLOOD small Negative    PH, URINE 5.0 4.5 - 8

## (undated) NOTE — MR AVS SNAPSHOT
[address]  [address]  [address] [id number]  [phone]               Thank you for choosing us for your health care visit with MARK Dumont.   We are glad to serve you and happy to provide you with this summary of Kaleigh Leap should be used 3 to 4 times a day. · You may use acetaminophen or ibuprofen to control pain, unless another medicine was prescribed. (Note: If you have chronic liver or kidney disease, or if you have ever had a stomach ulcer or gastrointestinal blee stomach ulcer or gastrointestinal bleeding, talk with your healthcare provider before using these medicines. Do not give aspirin to anyone under 25years of age who has a fever. It may cause severe illness or death.   Follow-up care  Follow up with your hea Results of Recent Testing       MyChart     Sign up for [facility], your secure online medical record. [address] will allow you to access patient instructions from your recent visit,  view other health information, and more.  To sign up or find more information

## (undated) NOTE — Clinical Note
Date: 3/27/2017    Patient Name: Raudel Moon          To Whom it may concern: This letter has been written at the patient's request. The above patient was seen at the [facility] for treatment of a medical condition.     This patient horacio